# Patient Record
Sex: FEMALE | Race: WHITE | NOT HISPANIC OR LATINO | Employment: UNEMPLOYED | URBAN - METROPOLITAN AREA
[De-identification: names, ages, dates, MRNs, and addresses within clinical notes are randomized per-mention and may not be internally consistent; named-entity substitution may affect disease eponyms.]

---

## 2024-11-19 ENCOUNTER — APPOINTMENT (OUTPATIENT)
Age: 39
End: 2024-11-19
Payer: COMMERCIAL

## 2024-11-19 ENCOUNTER — OFFICE VISIT (OUTPATIENT)
Age: 39
End: 2024-11-19
Payer: COMMERCIAL

## 2024-11-19 VITALS
OXYGEN SATURATION: 96 % | TEMPERATURE: 102.2 F | WEIGHT: 155.8 LBS | HEIGHT: 68 IN | BODY MASS INDEX: 23.61 KG/M2 | SYSTOLIC BLOOD PRESSURE: 124 MMHG | RESPIRATION RATE: 20 BRPM | DIASTOLIC BLOOD PRESSURE: 86 MMHG | HEART RATE: 112 BPM

## 2024-11-19 DIAGNOSIS — J22 LOWER RESPIRATORY INFECTION: Primary | ICD-10-CM

## 2024-11-19 DIAGNOSIS — R50.9 FEVER, UNSPECIFIED: ICD-10-CM

## 2024-11-19 DIAGNOSIS — R05.1 ACUTE COUGH: ICD-10-CM

## 2024-11-19 LAB
SARS-COV-2 AG UPPER RESP QL IA: NEGATIVE
VALID CONTROL: NORMAL

## 2024-11-19 PROCEDURE — 87811 SARS-COV-2 COVID19 W/OPTIC: CPT | Performed by: PHYSICIAN ASSISTANT

## 2024-11-19 PROCEDURE — G0382 LEV 3 HOSP TYPE B ED VISIT: HCPCS | Performed by: PHYSICIAN ASSISTANT

## 2024-11-19 PROCEDURE — 71046 X-RAY EXAM CHEST 2 VIEWS: CPT

## 2024-11-19 RX ORDER — AZITHROMYCIN 250 MG/1
TABLET, FILM COATED ORAL
Qty: 6 TABLET | Refills: 0 | Status: SHIPPED | OUTPATIENT
Start: 2024-11-19 | End: 2024-11-23

## 2024-11-19 RX ORDER — ALBUTEROL SULFATE 90 UG/1
2 INHALANT RESPIRATORY (INHALATION) EVERY 6 HOURS PRN
Qty: 8.5 G | Refills: 0 | Status: SHIPPED | OUTPATIENT
Start: 2024-11-19

## 2024-11-19 RX ORDER — BENZONATATE 200 MG/1
200 CAPSULE ORAL 3 TIMES DAILY PRN
Qty: 20 CAPSULE | Refills: 0 | Status: SHIPPED | OUTPATIENT
Start: 2024-11-19

## 2024-11-19 NOTE — PROGRESS NOTES
[facility]        NAME: Park Stone is a 39 y.o. female  : 1985    MRN: [id number]  DATE: 2024  TIME: 7:25 PM    Assessment and Plan   Lower respiratory infection [J22]  1. Lower respiratory infection  azithromycin (ZITHROMAX) 250 mg tablet    benzonatate (TESSALON) 200 MG capsule    albuterol (ProAir HFA) 90 mcg/act inhaler      2. Fever, unspecified  XR chest pa and lateral    Poct Covid 19 Rapid Antigen Test      3. Acute cough  XR chest pa and lateral    Poct Covid 19 Rapid Antigen Test            Patient Instructions     Patient has lower respiratory infection and x-ray reveals concern for possible right lower lobe pneumonia.  I prescribed her Z-Vijay, Tessalon Perles, and rescue inhaler and recommended fluids, rest, discussed OTC cough meds, fever management, close observation.  Rapid COVID testing performed today is negative.  Follow up with PCP in 3-5 days.  Proceed to  ER if symptoms worsen.    If tests have been performed at [facility], our office will contact you with results if changes need to be made to the care plan discussed with you at the visit.  You can review your full results on [facility].    Chief Complaint     Chief Complaint   Patient presents with    Cold Like Symptoms     Started Wednesday evening, felt discomfort on her chest. Fever started the day before, had really bad aches and shivers, Has had on an off fever. Took tylenol and ice which helped temporarily for the fever. Fever had reached 105. Had headaches and pain in eyes and ears, also coughed a lot. Today fever went up to 104.          History of Present Illness       Patient presents with onset almost 1 week ago of fever, cough, chest discomfort, headaches, B/L eye pain, B/L ear pain, chills, myalgias.  Has been trying to manage the fever with combination of Tylenol and ice but it has gone as high as 105 F.        Review of Systems   Review of Systems   Constitutional:  Positive for  "chills and fever.   HENT:  Positive for ear pain.    Eyes:  Positive for pain.   Respiratory:  Positive for cough.         Chest discomfort   Cardiovascular: Negative.    Gastrointestinal: Negative.    Genitourinary: Negative.    Musculoskeletal:  Positive for myalgias.   Neurological:  Positive for headaches.         Current Medications       Current Outpatient Medications:     albuterol (ProAir HFA) 90 mcg/act inhaler, Inhale 2 puffs every 6 (six) hours as needed for wheezing or shortness of breath, Disp: 8.5 g, Rfl: 0    azithromycin (ZITHROMAX) 250 mg tablet, Take 2 tablets today then 1 tablet daily x 4 days, Disp: 6 tablet, Rfl: 0    benzonatate (TESSALON) 200 MG capsule, Take 1 capsule (200 mg total) by mouth 3 (three) times a day as needed for cough, Disp: 20 capsule, Rfl: 0    Current Allergies     Allergies as of 11/19/2024 - Reviewed 11/19/2024   Allergen Reaction Noted    Hydrocodone Hives and Itching 11/19/2024            The following portions of the patient's history were reviewed and updated as appropriate: allergies, current medications, past family history, past medical history, past social history, past surgical history and problem list.     History reviewed. No pertinent past medical history.    Past Surgical History:   Procedure Laterality Date    WISDOM TOOTH EXTRACTION         Family History   Problem Relation Age of Onset    No Known Problems Mother     Hypertension Father          Medications have been verified.        Objective   /86 (BP Location: Right arm, Patient Position: Sitting, Cuff Size: Adult)   Pulse (!) 112   Temp (!) 102.2 °F (39 °C) (Tympanic)   Resp 20   Ht 5' 8\" (1.727 m)   Wt 70.7 kg (155 lb 12.8 oz)   LMP 10/28/2024 (Exact Date)   SpO2 96%   BMI 23.69 kg/m²   Patient's last menstrual period was 10/28/2024 (exact date).       Physical Exam     Physical Exam  Vitals reviewed.   Constitutional:       General: She is not in acute distress.     Appearance: She is " well-developed.   HENT:      Right Ear: Tympanic membrane, ear canal and external ear normal.      Left Ear: Tympanic membrane, ear canal and external ear normal.      Nose: Nose normal.      Mouth/Throat:      Mouth: Mucous membranes are moist.      Pharynx: Posterior oropharyngeal erythema present. No oropharyngeal exudate.      Tonsils: No tonsillar exudate.   Cardiovascular:      Rate and Rhythm: Normal rate and regular rhythm.      Pulses: Normal pulses.      Heart sounds: Normal heart sounds. No murmur heard.  Pulmonary:      Effort: Pulmonary effort is normal. No respiratory distress.      Breath sounds: Wheezing (Patchy right-sided wheezes heard on auscultation) present. No rhonchi.   Musculoskeletal:      Cervical back: Neck supple.   Lymphadenopathy:      Cervical: No cervical adenopathy.   Neurological:      Mental Status: She is alert and oriented to person, place, and time.

## 2024-11-20 ENCOUNTER — TELEPHONE (OUTPATIENT)
Age: 39
End: 2024-11-20

## 2024-11-20 NOTE — TELEPHONE ENCOUNTER
Called patient to review x-ray results. Reviewed results with permission from marquita and advised patient to continue taking medications prescribed by John. Pt expressed understanding and denies further needs at conclusion of call.

## 2025-06-09 ENCOUNTER — OFFICE VISIT (OUTPATIENT)
Age: 40
End: 2025-06-09
Payer: COMMERCIAL

## 2025-06-09 ENCOUNTER — TELEPHONE (OUTPATIENT)
Age: 40
End: 2025-06-09

## 2025-06-09 VITALS
BODY MASS INDEX: 25.55 KG/M2 | RESPIRATION RATE: 18 BRPM | HEART RATE: 64 BPM | SYSTOLIC BLOOD PRESSURE: 120 MMHG | HEIGHT: 67 IN | WEIGHT: 162.8 LBS | DIASTOLIC BLOOD PRESSURE: 82 MMHG | TEMPERATURE: 97.8 F | OXYGEN SATURATION: 100 %

## 2025-06-09 DIAGNOSIS — L23.7 POISON IVY DERMATITIS: Primary | ICD-10-CM

## 2025-06-09 PROCEDURE — 96372 THER/PROPH/DIAG INJ SC/IM: CPT | Performed by: NURSE PRACTITIONER

## 2025-06-09 PROCEDURE — 99213 OFFICE O/P EST LOW 20 MIN: CPT | Performed by: NURSE PRACTITIONER

## 2025-06-09 RX ORDER — METHYLPREDNISOLONE SODIUM SUCCINATE 40 MG/ML
40 INJECTION, POWDER, LYOPHILIZED, FOR SOLUTION INTRAMUSCULAR; INTRAVENOUS ONCE
Status: COMPLETED | OUTPATIENT
Start: 2025-06-09 | End: 2025-06-09

## 2025-06-09 RX ORDER — METHYLPREDNISOLONE 4 MG/1
TABLET ORAL
Qty: 21 EACH | Refills: 0 | Status: SHIPPED | OUTPATIENT
Start: 2025-06-09 | End: 2025-06-09 | Stop reason: CLARIF

## 2025-06-09 RX ORDER — METHYLPREDNISOLONE 4 MG/1
TABLET ORAL
Qty: 21 EACH | Refills: 0 | Status: SHIPPED | OUTPATIENT
Start: 2025-06-09 | End: 2025-06-10 | Stop reason: SDUPTHER

## 2025-06-09 RX ADMIN — METHYLPREDNISOLONE SODIUM SUCCINATE 40 MG: 40 INJECTION, POWDER, LYOPHILIZED, FOR SOLUTION INTRAMUSCULAR; INTRAVENOUS at 12:23

## 2025-06-09 NOTE — PROGRESS NOTES
[facility]        NAME: Park Stone is a 39 y.o. female  : 1985    MRN: [id number]  DATE: 2025  TIME: 4:46 PM    Assessment and Plan   Poison ivy dermatitis [L23.7]  1. Poison ivy dermatitis  methylPREDNISolone sodium succinate (Solu-MEDROL) injection 40 mg    methylPREDNISolone 4 MG tablet therapy pack    DISCONTINUED: methylPREDNISolone 4 MG tablet therapy pack        Acute symptomatic Solu-Medrol injection 40 mg provided in office no complications at that time, recommend start 48 hours later with Medrol Dosepak educated on side effects proper use of medication follow-up with primary care with worsening symptoms no improvement    Patient Instructions       Follow up with PCP in 3-5 days.  Proceed to  ER if symptoms worsen.    If tests have been performed at [facility], our office will contact you with results if changes need to be made to the care plan discussed with you at the visit.  You can review your full results on [facility].    Chief Complaint     Chief Complaint   Patient presents with    Poison Ivy     This started about 10 days ago. She took ibuprofen and Benadryl. Has been getting worse since. Located on both arms.         History of Present Illness       Patient is a 39-year-old female arrives with reports of diffuse rash to the bilateral arms associated with itching has been occurring for x 10 days.  Has taken ibuprofen and Benadryl without relief is worsening.        Review of Systems   Review of Systems   Constitutional:  Negative for activity change, appetite change, fatigue and fever.   Skin:  Positive for color change and rash. Negative for wound.         Current Medications     Current Medications[1]    Current Allergies     Allergies as of 2025 - Reviewed 2025   Allergen Reaction Noted    Hydrocodone Hives and Itching 2024            The following portions of the patient's history were reviewed and updated as appropriate: allergies,  "current medications, past family history, past medical history, past social history, past surgical history and problem list.     Past Medical History[2]    Past Surgical History[3]    Family History[4]      Medications have been verified.        Objective   /82 (Patient Position: Sitting, Cuff Size: Adult)   Pulse 64   Temp 97.8 °F (36.6 °C) (Tympanic)   Resp 18   Ht 5' 7\" (1.702 m)   Wt 73.8 kg (162 lb 12.8 oz)   LMP 05/14/2025 (Exact Date)   SpO2 100%   BMI 25.50 kg/m²   Patient's last menstrual period was 05/14/2025 (exact date).       Physical Exam     Physical Exam  Vitals and nursing note reviewed.   Constitutional:       General: She is not in acute distress.     Appearance: Normal appearance. She is not ill-appearing or diaphoretic.   HENT:      Head: Normocephalic and atraumatic.      Nose: No congestion or rhinorrhea.     Eyes:      General: No scleral icterus.        Right eye: No discharge.         Left eye: No discharge.     Pulmonary:      Effort: Pulmonary effort is normal. No respiratory distress.     Musculoskeletal:         General: Normal range of motion.      Cervical back: Normal range of motion.     Skin:     Coloration: Skin is not jaundiced or pale.      Findings: Erythema and rash present. No bruising. Rash is macular, papular and vesicular.      Comments: Diffuse maculopapular vesicular rash to the bilateral arms     Neurological:      General: No focal deficit present.      Mental Status: She is alert and oriented to person, place, and time.     Psychiatric:         Mood and Affect: Mood normal.         Behavior: Behavior normal.         Thought Content: Thought content normal.         Judgment: Judgment normal.                        [1]   Current Outpatient Medications:     methylPREDNISolone 4 MG tablet therapy pack, Use as directed on package, Disp: 21 each, Rfl: 0    albuterol (ProAir HFA) 90 mcg/act inhaler, Inhale 2 puffs every 6 (six) hours as needed for wheezing or " shortness of breath (Patient not taking: Reported on 6/9/2025), Disp: 8.5 g, Rfl: 0    benzonatate (TESSALON) 200 MG capsule, Take 1 capsule (200 mg total) by mouth 3 (three) times a day as needed for cough (Patient not taking: Reported on 6/9/2025), Disp: 20 capsule, Rfl: 0  No current facility-administered medications for this visit.  [2] No past medical history on file.  [3]   Past Surgical History:  Procedure Laterality Date    WISDOM TOOTH EXTRACTION     [4]   Family History  Problem Relation Name Age of Onset    No Known Problems Mother      Hypertension Father

## 2025-06-10 DIAGNOSIS — L23.7 POISON IVY DERMATITIS: ICD-10-CM

## 2025-06-10 RX ORDER — METHYLPREDNISOLONE 4 MG/1
TABLET ORAL
Qty: 21 EACH | Refills: 0 | Status: SHIPPED | OUTPATIENT
Start: 2025-06-10

## 2025-06-17 ENCOUNTER — TELEPHONE (OUTPATIENT)
Age: 40
End: 2025-06-17

## 2025-06-18 ENCOUNTER — TELEPHONE (OUTPATIENT)
Age: 40
End: 2025-06-18

## 2025-06-18 DIAGNOSIS — L23.7 POISON IVY DERMATITIS: Primary | ICD-10-CM

## 2025-06-18 RX ORDER — METHYLPREDNISOLONE 4 MG/1
TABLET ORAL
Qty: 21 EACH | Refills: 0 | Status: SHIPPED | OUTPATIENT
Start: 2025-06-18